# Patient Record
Sex: FEMALE | Race: BLACK OR AFRICAN AMERICAN | Employment: STUDENT | URBAN - NONMETROPOLITAN AREA
[De-identification: names, ages, dates, MRNs, and addresses within clinical notes are randomized per-mention and may not be internally consistent; named-entity substitution may affect disease eponyms.]

---
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
## 2022-01-04 ENCOUNTER — OFFICE VISIT (OUTPATIENT)
Dept: FAMILY MEDICINE CLINIC | Age: 22
End: 2022-01-04
Payer: COMMERCIAL

## 2022-01-04 VITALS
TEMPERATURE: 97.3 F | RESPIRATION RATE: 12 BRPM | SYSTOLIC BLOOD PRESSURE: 122 MMHG | DIASTOLIC BLOOD PRESSURE: 70 MMHG | WEIGHT: 166 LBS | HEIGHT: 63 IN | BODY MASS INDEX: 29.41 KG/M2 | HEART RATE: 81 BPM | OXYGEN SATURATION: 99 %

## 2022-01-04 DIAGNOSIS — Z11.59 ENCOUNTER FOR HEPATITIS C SCREENING TEST FOR LOW RISK PATIENT: ICD-10-CM

## 2022-01-04 DIAGNOSIS — E55.9 VITAMIN D DEFICIENCY: ICD-10-CM

## 2022-01-04 DIAGNOSIS — Z00.00 WELLNESS EXAMINATION: Primary | ICD-10-CM

## 2022-01-04 DIAGNOSIS — E88.81 INSULIN RESISTANCE: ICD-10-CM

## 2022-01-04 DIAGNOSIS — Z11.4 SCREENING FOR HIV (HUMAN IMMUNODEFICIENCY VIRUS): ICD-10-CM

## 2022-01-04 DIAGNOSIS — R68.89 COLD INTOLERANCE: ICD-10-CM

## 2022-01-04 DIAGNOSIS — Z00.00 ENCOUNTER FOR WELL ADULT EXAM WITHOUT ABNORMAL FINDINGS: ICD-10-CM

## 2022-01-04 PROCEDURE — 99395 PREV VISIT EST AGE 18-39: CPT | Performed by: NURSE PRACTITIONER

## 2022-01-04 PROCEDURE — G8484 FLU IMMUNIZE NO ADMIN: HCPCS | Performed by: NURSE PRACTITIONER

## 2022-01-04 SDOH — ECONOMIC STABILITY: FOOD INSECURITY: WITHIN THE PAST 12 MONTHS, YOU WORRIED THAT YOUR FOOD WOULD RUN OUT BEFORE YOU GOT MONEY TO BUY MORE.: NEVER TRUE

## 2022-01-04 SDOH — ECONOMIC STABILITY: FOOD INSECURITY: WITHIN THE PAST 12 MONTHS, THE FOOD YOU BOUGHT JUST DIDN'T LAST AND YOU DIDN'T HAVE MONEY TO GET MORE.: NEVER TRUE

## 2022-01-04 ASSESSMENT — ENCOUNTER SYMPTOMS
EYE DISCHARGE: 0
COLOR CHANGE: 0
RHINORRHEA: 0
BLOOD IN STOOL: 0
NAUSEA: 0
CONSTIPATION: 0
DIARRHEA: 0
ABDOMINAL DISTENTION: 0
SHORTNESS OF BREATH: 0
COUGH: 0
ABDOMINAL PAIN: 0
SORE THROAT: 0
ANAL BLEEDING: 0
EYE REDNESS: 0

## 2022-01-04 ASSESSMENT — PATIENT HEALTH QUESTIONNAIRE - PHQ9
SUM OF ALL RESPONSES TO PHQ9 QUESTIONS 1 & 2: 0
SUM OF ALL RESPONSES TO PHQ QUESTIONS 1-9: 0
1. LITTLE INTEREST OR PLEASURE IN DOING THINGS: 0
SUM OF ALL RESPONSES TO PHQ QUESTIONS 1-9: 0
SUM OF ALL RESPONSES TO PHQ QUESTIONS 1-9: 0
2. FEELING DOWN, DEPRESSED OR HOPELESS: 0
SUM OF ALL RESPONSES TO PHQ QUESTIONS 1-9: 0

## 2022-01-04 ASSESSMENT — SOCIAL DETERMINANTS OF HEALTH (SDOH): HOW HARD IS IT FOR YOU TO PAY FOR THE VERY BASICS LIKE FOOD, HOUSING, MEDICAL CARE, AND HEATING?: NOT HARD AT ALL

## 2022-01-04 NOTE — PROGRESS NOTES
Health Maintenance Due   Topic Date Due    Hepatitis C screen  Never done    Depression Screen  Never done    HIV screen  Never done    Chlamydia screen  Never done    COVID-19 Vaccine (2 - Booster for [employer] series) 08/30/2021    Flu vaccine (1) 09/01/2021

## 2022-01-04 NOTE — PROGRESS NOTES
Well Adult Note  Name: Aleksander Garcia Date: 2022   MRN: [id number] Sex: Female   Age: 25 y.o. Ethnicity: Non- / Non    : 2000 Race: Verónica Sykes / Prince Cam is here for well adult exam.  History:    Health Habits: With regard to her health habits, she eats 3 meals and 0 snacks per day. She does exercise regularly:   Physical activities include: lifting, 6 times per week, 90 minutes/day. She does take over the counter vitamins. She never had a blood transfusion. Does have tattoo. She wears seatbelts while riding a car. She does not text or talk on the phone while driving. She performs all of her ADL's without problem. She is independent, she cooks, drives, bathes, and gets dressed without assistance. She is not . She has 0 children. She does work. She works 25-30 hours a week. Health Maintenance   Topic Date Due    Hepatitis C screen  Never done    Depression Screen  Never done    HIV screen  Never done    Chlamydia screen  Never done    COVID-19 Vaccine (2 - Booster for [employer] series) 2021    Flu vaccine (1) 2021    DTaP/Tdap/Td vaccine (7 - Td or Tdap) 2025    Hepatitis A vaccine  Completed    Hepatitis B vaccine  Completed    Hib vaccine  Completed    HPV vaccine  Completed    Varicella vaccine  Completed    Meningococcal (ACWY) vaccine  Completed    Pneumococcal 0-64 years Vaccine  Aged Out     She  reports that she has never smoked. She has never used smokeless tobacco. She reports that she does not drink alcohol and does not use drugs. .  Has not had a pap yet . Her last menstrual cycle was 3weeks ago. She is not sexually active. She has had 0sexual partner for the last 1 years. Review of Systems   Constitutional: Negative for chills, fatigue and fever. HENT: Negative for congestion, ear pain, postnasal drip, rhinorrhea and sore throat. Eyes: Negative for discharge and redness.    Respiratory: Negative for cough and shortness of breath. Cardiovascular: Negative for chest pain and leg swelling. Gastrointestinal: Negative for abdominal distention, abdominal pain, anal bleeding, blood in stool, constipation, diarrhea and nausea. Skin: Negative for color change and rash. Neurological: Negative for facial asymmetry, speech difficulty and weakness. Hematological: Does not bruise/bleed easily. Psychiatric/Behavioral: Negative for agitation and confusion. No Known Allergies      Prior to Visit Medications    Medication Sig Taking? Authorizing Provider   Multiple Vitamins-Minerals (MULTIVITAMIN ADULTS PO) Take by mouth daily Yes Historical Provider, MD   Cyanocobalamin (VITAMIN B-12 PO) Take by mouth daily Yes Historical Provider, MD   CREATINE PO Take by mouth Yes Historical Provider, MD   NONFORMULARY daily BCAA Powder Yes Historical Provider, MD   COLLAGEN PO Take by mouth daily Yes Historical Provider, MD   ibuprofen (ADVIL;MOTRIN) 600 MG tablet Take 1 tablet by mouth 3 times daily as needed for Pain or Fever (Take with food 3 times daily for pain or fever)  Patient not taking: Reported on 1/4/2022  Jazz Zarate MD       History reviewed. No pertinent past medical history. History reviewed. No pertinent surgical history.       Family History   Problem Relation Age of Onset    High Blood Pressure Mother     Arthritis Neg Hx     Birth Defects Neg Hx     Cancer Neg Hx     Diabetes Neg Hx     Hearing Loss Neg Hx     Kidney Disease Neg Hx     Mental Illness Neg Hx     Mental Retardation Neg Hx        Social History     Tobacco Use    Smoking status: Never Smoker    Smokeless tobacco: Never Used   Vaping Use    Vaping Use: Never used   Substance Use Topics    Alcohol use: No    Drug use: No       Objective   /70 (Site: Left Upper Arm, Position: Sitting, Cuff Size: Medium Adult)   Pulse 81   Temp 97.3 °F (36.3 °C) (Temporal)   Resp 12   Ht 5' 3\" (1.6 m)   Wt 166 lb (75.3 kg)   [facility] 12/14/2021   SpO2 99%   BMI 29.41 kg/m²   Wt Readings from Last 3 Encounters:   01/04/22 166 lb (75.3 kg) (91 %, Z= 1.34)*   10/25/20 156 lb (70.8 kg) (88 %, Z= 1.18)*   01/20/18 161 lb (73 kg) (93 %, Z= 1.51)*     * Growth percentiles are based on [facility] (Girls, 2-20 Years) data. There were no vitals filed for this visit. Physical Exam  Constitutional:       Appearance: Normal appearance. She is well-developed. She is not toxic-appearing or diaphoretic. HENT:      Head: Normocephalic and atraumatic. Right Ear: Hearing normal.      Left Ear: Hearing normal.      Nose: No nasal deformity. Eyes:      General:         Right eye: No discharge. Left eye: No discharge. Cardiovascular:      Rate and Rhythm: Normal rate and regular rhythm. Pulmonary:      Effort: Pulmonary effort is normal. No prolonged expiration or respiratory distress. Breath sounds: Wheezing present. Musculoskeletal:      Cervical back: Normal range of motion. Skin:     Findings: No rash (On exposed areas visible on camera). Neurological:      Mental Status: She is alert and oriented to person, place, and time. Psychiatric:         Attention and Perception: Attention normal.         Mood and Affect: Mood normal.         Speech: Speech normal.         Behavior: Behavior normal.         Thought Content: Thought content normal.         Cognition and Memory: Cognition and memory normal.         Judgment: Judgment normal.           Assessment   Plan   1. Wellness examination  -     CBC Auto Differential; Future  -     Hepatic Function Panel; Future  -     Comprehensive Metabolic Panel; Future  -     High sensitivity CRP; Future  -     Lipid Panel; Future  -     TSH with Reflex; Future  -     Vitamin D 25 Hydroxy; Future  2. Insulin resistance  -     Comprehensive Metabolic Panel; Future  3. Vitamin D deficiency  -     Vitamin D 25 Hydroxy; Future  4.  Cold intolerance  -     CBC Auto Differential; Future  5. Encounter for well adult exam without abnormal findings  6. Screening for HIV (human immunodeficiency virus)  -     HIV Screen; Future  7. Encounter for hepatitis C screening test for low risk patient  -     Hepatitis C Antibody;  Future         Personalized Preventive Plan   Current Health Maintenance Status  Immunization History   Administered Date(s) Administered    COVID-19, J&J, PF, 0.5 mL 07/05/2021    DTP 04/29/2003, 07/28/2003, 09/29/2003, 08/16/2004, 04/03/2008    HPV (Human Papilloma Virus)Vaccine 06/07/2012    HPV Quadrivalent (Gardasil) 07/29/2013, 11/12/2013    Hepatitis A Ped/Adol (Havrix, Vaqta) 06/07/2012, 07/29/2013    Hepatitis B Ped/Adol (Engerix-B, Recombivax HB) 02/18/2003, 04/29/2003, 09/29/2003    Hib vaccine 02/18/2003, 04/29/2003, 08/16/2004    Influenza Nasal 10/12/2010, 11/21/2012, 11/12/2013, 10/06/2014    Influenza Virus Vaccine 09/24/2014    Influenza, High Dose (Fluzone 65 yrs and older) 10/12/2010    MMR 08/16/2004, 04/03/2008    Meningococcal B, OMV (Bexsero) 03/11/2020    Meningococcal MCV4P (Menactra) 06/22/2015, 03/11/2020    Polio IPV (IPOL) 02/18/2003, 04/29/2003    Polio Virus Vaccine 04/03/2008    Tdap (Boostrix, Adacel) 06/22/2015    Varicella (Varivax) 04/03/2008, 07/29/2013        Health Maintenance   Topic Date Due    Hepatitis C screen  Never done    Depression Screen  Never done    HIV screen  Never done    Chlamydia screen  Never done    COVID-19 Vaccine (2 - Booster for [employer] series) 08/30/2021    Flu vaccine (1) 09/01/2021    DTaP/Tdap/Td vaccine (7 - Td or Tdap) 06/22/2025    Hepatitis A vaccine  Completed    Hepatitis B vaccine  Completed    Hib vaccine  Completed    HPV vaccine  Completed    Varicella vaccine  Completed    Meningococcal (ACWY) vaccine  Completed    Pneumococcal 0-64 years Vaccine  Aged Out     Recommendations for dakick Due: see orders and patient instructions/AVS.    Return in about 1 year (around 1/4/2023) for Annual Physical.

## 2022-01-04 NOTE — PATIENT INSTRUCTIONS
Thank you   1. Thank you for trusting us with your healthcare needs. You may receive a survey regarding today's visit. It would help us out if you would take a few moments to provide your feedback. We value your input. 2. Please bring in ALL medications BOTTLES, including inhalers, herbal supplements, over the counter, prescribed & non-prescribed medicine. The office would like actual medication bottles and a list.   3. Please note our OFFICE POLICIES:   a. Prior to getting your labs drawn, please check with your insurance company for benefits and eligibility of lab services. Often, insurance companies cover certain tests for preventative visits only. It is patient's responsibility to see what is covered. b. We are unable to change a diagnosis after the test has been performed. c. Lab orders will not be re-printed. Please hold onto your original lab orders and take them to your lab to be completed. d. If you no show your scheduled appointment three times, you will be dismissed from this practice. e. Donalda Mandril must be completed 24 hours prior to your schedule appointment. 4. If the list below has been completed, PLEASE FAX RECORDS TO OUR OFFICE @ [phone]. Once the records have been received we will update your records at our office:  Health Maintenance Due   Topic Date Due    Hepatitis C screen  Never done    Depression Screen  Never done    HIV screen  Never done    Chlamydia screen  Never done    COVID-19 Vaccine (2 - Booster for [employer] series) 08/30/2021    Flu vaccine (1) 09/01/2021             Patient Education        Insulin Resistance: Care Instructions  Your Care Instructions     Insulin resistance means that the body can't use insulin as it should. Insulin lets sugar (glucose) enter the body's cells, where it is used for energy. It also helps muscles, fat, and liver cells store sugar to be released when needed.  If the body tissues don't respond to insulin right, the blood sugar level rises.  Insulin resistance mainly is caused by obesity. But other medical conditions, such as acromegaly and Cushing's syndrome, also can cause it. It can run in families too. Follow-up care is a key part of your treatment and safety. Be sure to make and go to all appointments, and call your doctor if you are having problems. It's also a good idea to know your test results and keep a list of the medicines you take. How can you care for yourself at home? · Take your medicines exactly as prescribed. Call your doctor if you think you are having a problem with your medicine. You will get more details on the specific medicines your doctor prescribes. · Make healthy food choices. · Get at least 30 minutes of exercise on most days of the week. Exercise helps control your blood sugar. It also helps you stay at a healthy weight. Walking is a good choice. You also may want to do other activities, such as running, swimming, cycling, or playing tennis or team sports. · Try to lose weight. Losing even a small amount of weight can help. · Do not smoke. If you need help quitting, talk to your doctor about stop-smoking programs and medicines. These can increase your chances of quitting for good. When should you call for help? Watch closely for changes in your health, and be sure to contact your doctor if:    · Your blood sugar stays outside the level your doctor set for you.     · You have any problems. Where can you learn more? Go to https://Science Fantasypesindhu.[employer]fin. org and sign in to your [employer] account. Enter [phone] [id number] [phone] in the [facility] box to learn more about \"Insulin Resistance: Care Instructions. \"     If you do not have an account, please click on the \"Sign Up Now\" link. Current as of: July 28, 2021               Content Version: 13.1  © 3136-1790 Healthwise, Incorporated. Care instructions adapted under license by [facility] ([facility]).  If you have questions about a medical condition or this instruction, always ask your healthcare professional. [place] [address] any warranty or liability for your use of this information. Patient Education        Well Visit, Ages 25 to 48: Care Instructions  Overview     Well visits can help you stay healthy. Your doctor has checked your overall health and may have suggested ways to take good care of yourself. Your doctor also may have recommended tests. At home, you can help prevent illness with healthy eating, regular exercise, and other steps. Follow-up care is a key part of your treatment and safety. Be sure to make and go to all appointments, and call your doctor if you are having problems. It's also a good idea to know your test results and keep a list of the medicines you take. How can you care for yourself at home? · Get screening tests that you and your doctor decide on. Screening helps find diseases before any symptoms appear. · Eat healthy foods. Choose fruits, vegetables, whole grains, protein, and low-fat dairy foods. Limit fat, especially saturated fat. Reduce salt in your diet. · Limit alcohol. If you are a man, have no more than 2 drinks a day or 14 drinks a week. If you are a woman, have no more than 1 drink a day or 7 drinks a week. · Get at least 30 minutes of physical activity on most days of the week. Walking is a good choice. You also may want to do other activities, such as running, swimming, cycling, or playing tennis or team sports. Discuss any changes in your exercise program with your doctor. · Reach and stay at a healthy weight. This will lower your risk for many problems, such as obesity, diabetes, heart disease, and high blood pressure. · Do not smoke or allow others to smoke around you. If you need help quitting, talk to your doctor about stop-smoking programs and medicines. These can increase your chances of quitting for good. · Care for your mental health. It is easy to get weighed down by worry and stress.  Learn strategies to manage stress, like deep breathing and mindfulness, and stay connected with your family and community. If you find you often feel sad or hopeless, talk with your doctor. Treatment can help. · Talk to your doctor about whether you have any risk factors for sexually transmitted infections (STIs). You can help prevent STIs if you wait to have sex with a new partner (or partners) until you've each been tested for STIs. It also helps if you use condoms (male or female condoms) and if you limit your sex partners to one person who only has sex with you. Vaccines are available for some STIs, such as HPV. · Use birth control if it's important to you to prevent pregnancy. Talk with your doctor about the choices available and what might be best for you. · If you think you may have a problem with alcohol or drug use, talk to your doctor. This includes prescription medicines (such as amphetamines and opioids) and illegal drugs (such as cocaine and methamphetamine). Your doctor can help you figure out what type of treatment is best for you. · Protect your skin from too much sun. When you're outdoors from 10 a.m. to 4 p.m., stay in the shade or cover up with clothing and a hat with a wide brim. Wear sunglasses that block UV rays. Even when it's cloudy, put broad-spectrum sunscreen (SPF 30 or higher) on any exposed skin. · See a dentist one or two times a year for checkups and to have your teeth cleaned. · Wear a seat belt in the car. When should you call for help? Watch closely for changes in your health, and be sure to contact your doctor if you have any problems or symptoms that concern you. Where can you learn more? Go to https://Dashlaneyg.health-partners. org and sign in to your [employer] account. Enter P072 in the [employer] box to learn more about \"Well Visit, Ages 25 to 48: Care Instructions. \"     If you do not have an account, please click on the \"Sign Up Now\" link.   Current as of: October 6, 2021               Content Version: 13.1  © 2006-2021 Healthwise, [employer]. Care instructions adapted under license by [facility] ([facility]). If you have questions about a medical condition or this instruction, always ask your healthcare professional. [address] any warranty or liability for your use of this information. Patient Education        Learning About Vitamin D  Why is it important to get enough vitamin D? Your body needs vitamin D to absorb calcium. Calcium keeps your bones and muscles, including your heart, healthy and strong. If your muscles don't get enough calcium, they can cramp, hurt, or feel weak. You may have long-term (chronic) muscle aches and pains. If you don't get enough vitamin D throughout life, you have an increased chance of having thin and brittle bones (osteoporosis) in your later years. Children who don't get enough vitamin D may not grow as much as others their age. They also have a chance of getting a rare disease called rickets. It causes weak bones. Vitamin D and calcium are added to many foods. And your body uses sunshine to make its own vitamin D. How much vitamin D do you need? The recommended dietary allowance (RDA) for vitamin D is 600 IU (international units) every day for people ages 3 through 79. Adults 71 and older need 800 IU every day. How can you get more vitamin D? Foods that contain vitamin D include:  · [place], tuna, and mackerel. These are some of the best foods to eat when you need to get more vitamin D.  · Cheese, egg yolks, and beef liver. These foods have vitamin D in small amounts. · Milk, soy drinks, orange juice, yogurt, margarine, and some kinds of cereal have vitamin D added to them. Some people don't make vitamin D as well as others. They may have to take extra care in getting enough vitamin D. Things that reduce how much vitamin D your body makes include:  · Having dark skin.   · Age, especially if you are older than 72. · Digestive problems, such as Crohn's or celiac disease. · Liver and kidney disease. Some people who do not get enough vitamin D may need supplements. Are there any risks from taking vitamin D?  · Too much vitamin D:  ? Can damage your kidneys. ? Can cause nausea and vomiting, constipation, and weakness. ? Raises the amount of calcium in your blood. If this happens, you can get confused or have an irregular heart rhythm. · Vitamin D may interact with other medicines. Tell your doctor about all of the medicines you take, including over-the-counter drugs, herbs, and pills. Tell your doctor about all of your current medical problems. Where can you learn more? Go to https://Gera-IT.[employer]. org and sign in to your [employer] account. Enter [id number] in the [employer] box to learn more about \"Learning About Vitamin D. \"     If you do not have an account, please click on the \"Sign Up Now\" link. Current as of: September 8, 2021               Content Version: 13.1  © 2006-2021 [employer]. Care instructions adapted under license by [employer]. If you have questions about a medical condition or this instruction, always ask your healthcare professional. [place] [address] any warranty or liability for your use of this information.

## 2022-01-09 ENCOUNTER — HOSPITAL ENCOUNTER (EMERGENCY)
Age: 22
Discharge: HOME | End: 2022-01-09
Payer: COMMERCIAL

## 2022-01-09 VITALS
OXYGEN SATURATION: 100 % | DIASTOLIC BLOOD PRESSURE: 74 MMHG | RESPIRATION RATE: 18 BRPM | SYSTOLIC BLOOD PRESSURE: 139 MMHG | TEMPERATURE: 97.1 F | HEART RATE: 82 BPM

## 2022-01-09 DIAGNOSIS — Z20.822 EXPOSURE TO COVID-19 VIRUS: Primary | ICD-10-CM

## 2022-01-09 DIAGNOSIS — Z20.822 LAB TEST NEGATIVE FOR COVID-19 VIRUS: ICD-10-CM

## 2022-01-09 LAB — SARS-COV-2, NAA: NOT  DETECTED

## 2022-01-09 PROCEDURE — 99213 OFFICE O/P EST LOW 20 MIN: CPT

## 2022-01-09 PROCEDURE — 99213 OFFICE O/P EST LOW 20 MIN: CPT | Performed by: NURSE PRACTITIONER

## 2022-01-09 PROCEDURE — 87635 SARS-COV-2 COVID-19 AMP PRB: CPT

## 2022-01-09 ASSESSMENT — ENCOUNTER SYMPTOMS
SINUS PRESSURE: 0
TROUBLE SWALLOWING: 0
BACK PAIN: 0
NAUSEA: 0
RHINORRHEA: 0
SHORTNESS OF BREATH: 0
COUGH: 0
SORE THROAT: 0
DIARRHEA: 0
VOMITING: 0

## 2022-01-09 NOTE — ED NOTES
Discharge assessment complete. No changes. All discharge education and information given. Instructed to go to ED for any worsening symptoms. Verbalized Understanding. Left in stable cond.      Cullen Dolan RN  01/09/22 [id number]

## 2022-01-09 NOTE — ED PROVIDER NOTES
[facility] 36  Urgent Care Encounter       CHIEF COMPLAINT       Chief Complaint   Patient presents with    Covid Testing     chills headache body aches       Nurses Notes reviewed and I agree except as noted in the HPI. HISTORY OF PRESENT ILLNESS   Kalpana Eastman is a 25 y.o. female who presents to the urgent care center complaining of body aches chills and a dull headache. Patient states that she was around her brother who tested positive yesterday. Patient denies any other symptoms at the present time. Patient has not taken any medication patient sitting on table does not appear to be in any acute distress at the present time. The history is provided by the patient. No  was used. REVIEW OF SYSTEMS     Review of Systems   Constitutional: Positive for chills. Negative for activity change, appetite change, fatigue and fever. HENT: Negative for congestion, ear pain, rhinorrhea, sinus pressure, sore throat and trouble swallowing. Respiratory: Negative for cough and shortness of breath. Cardiovascular: Negative for chest pain. Gastrointestinal: Negative for diarrhea, nausea and vomiting. Musculoskeletal: Positive for myalgias. Negative for back pain and neck stiffness. Skin: Negative for rash. Allergic/Immunologic: Negative for environmental allergies. Neurological: Positive for headaches. Negative for dizziness and light-headedness. Hematological: Negative for adenopathy. PAST MEDICAL HISTORY   History reviewed. No pertinent past medical history. SURGICALHISTORY     Patient  has no past surgical history on file.     CURRENT MEDICATIONS       Discharge Medication List as of 1/9/2022  4:48 PM      CONTINUE these medications which have NOT CHANGED    Details   Multiple Vitamins-Minerals (MULTIVITAMIN ADULTS PO) Take by mouth dailyHistorical Med      Cyanocobalamin (VITAMIN B-12 PO) Take by mouth dailyHistorical Med      CREATINE PO Take by mouthHistorical Med      NONFORMULARY daily BCAA PowderHistorical Med      COLLAGEN PO Take by mouth dailyHistorical Med      ibuprofen (ADVIL;MOTRIN) 600 MG tablet Take 1 tablet by mouth 3 times daily as needed for Pain or Fever (Take with food 3 times daily for pain or fever), Disp-20 tablet, R-0Print             ALLERGIES     Patient is has No Known Allergies. Patients   Immunization History   Administered Date(s) Administered    COVID-19, J&J, PF, 0.5 mL 07/05/2021    DTP 04/29/2003, 07/28/2003, 09/29/2003, 08/16/2004, 04/03/2008    HPV (Human Papilloma Virus)Vaccine 06/07/2012    HPV Quadrivalent (Gardasil) 07/29/2013, 11/12/2013    Hepatitis A Ped/Adol (Havrix, Vaqta) 06/07/2012, 07/29/2013    Hepatitis B Ped/Adol (Engerix-B, Recombivax HB) 02/18/2003, 04/29/2003, 09/29/2003    Hib vaccine 02/18/2003, 04/29/2003, 08/16/2004    Influenza Nasal 10/12/2010, 11/21/2012, 11/12/2013, 10/06/2014    Influenza Virus Vaccine 09/24/2014    Influenza, High Dose (Fluzone 65 yrs and older) 10/12/2010    MMR 08/16/2004, 04/03/2008    Meningococcal B, OMV (Bexsero) 03/11/2020    Meningococcal MCV4P (Menactra) 06/22/2015, 03/11/2020    Polio IPV (IPOL) 02/18/2003, 04/29/2003    Polio Virus Vaccine 04/03/2008    Tdap (Boostrix, Adacel) 06/22/2015    Varicella (Varivax) 04/03/2008, 07/29/2013       FAMILY HISTORY     Patient's family history includes High Blood Pressure in her mother. SOCIAL HISTORY     Patient  reports that she has never smoked. She has never used smokeless tobacco. She reports that she does not drink alcohol and does not use drugs. PHYSICAL EXAM     ED TRIAGE VITALS  BP: 139/74, Temp: 97.1 °F (36.2 °C), Heart Rate: 82, Resp: 18, SpO2: 100 %,Estimated body mass index is 29.41 kg/m² as calculated from the following:    Height as of 1/4/22: 5' 3\" (1.6 m). Weight as of 1/4/22: 166 lb (75.3 kg). ,Patient's last menstrual period was 12/14/2021.     Physical Exam  Vitals and nursing adenopathy. Left side of head: No submental, submandibular, tonsillar, preauricular, posterior auricular or occipital adenopathy. Cervical: No cervical adenopathy. Right cervical: No superficial, deep or posterior cervical adenopathy. Left cervical: No superficial, deep or posterior cervical adenopathy. Upper Body:      Right upper body: No supraclavicular adenopathy. Left upper body: No supraclavicular adenopathy. Skin:     General: Skin is warm and dry. Capillary Refill: Capillary refill takes less than 2 seconds. Findings: No rash. Neurological:      Mental Status: She is alert and oriented to person, place, and time. Psychiatric:         Mood and Affect: Mood normal.         Behavior: Behavior normal. Behavior is cooperative. DIAGNOSTIC RESULTS     Labs:  Results for orders placed or performed during the hospital encounter of 01/09/22   COVID-19, Rapid   Result Value Ref Range    SARS-CoV-2, SARAH NOT  DETECTED NOT DETECTED       IMAGING:    No orders to display         EKG:      URGENT CARE COURSE:     Vitals:    01/09/22 1609   BP: 139/74   Pulse: 82   Resp: 18   Temp: 97.1 °F (36.2 °C)   TempSrc: Tympanic   SpO2: 100%       Medications - No data to display         PROCEDURES:  None    FINAL IMPRESSION      1. Exposure to COVID-19 virus    2. Lab test negative for COVID-19 virus          DISPOSITION/ PLAN      The patient was advised to drink plenty of fluids. They may take Tylenol for fever or body aches. Take prescribed medication as directed. They may also take OTC antihistamines/ decongestant as needed. Pt is advised to go to ER if symptoms worsen, new symptoms develop, high fever >102, vomiting, breathing difficulty, lethargy, chest pain or shortness of breath Dial 911. The patient or patient's representative is agreeable to the treatment plan they're advised to follow-up with her primary care provider in one week for reevaluation.     PATIENT REFERRED TO:  CYNTHIA Cesar CNP   [address] / [facility] [address]      DISCHARGE MEDICATIONS:  Discharge Medication List as of 1/9/2022  4:48 PM          Discharge Medication List as of 1/9/2022  4:48 PM          Discharge Medication List as of 1/9/2022  4:48 PM          CYNTHIA Stack CNP    (Please note that portions of this note were completed with a voice recognition program. Efforts were made to edit the dictations but occasionally words are mis-transcribed.)           CYNTHIA tSack CNP  01/09/22 [id number]

## 2022-01-10 ENCOUNTER — TELEPHONE (OUTPATIENT)
Dept: FAMILY MEDICINE CLINIC | Age: 22
End: 2022-01-10

## 2022-01-10 NOTE — LETTER
[facility]  [address] [address], [address]  Phone: [phone]  Fax: [phone]    January 10, 2022    Ana Ribera  [address] [place] [address] [address] [address]    Dear Guillermina Odom,    This letter is regarding your Emergency Department (ED) visit at [facility] on 1/9/22. Mary Mcgill wanted to make sure that you understand your discharge instructions and that you were able to fill any prescriptions that may have been ordered for you. Please contact the office at the above phone number if the ED advised you to follow up with Mary Mcgill, or if you have any further questions or needs. Also did you know -   *Visiting the ED for a non-emergency could result in higher co-pays than you would normally be subject to paying? *You can call your doctor even after hours so they can direct you to the most appropriate care. [facility]) practices can often offer you an appointment on the same day that you call. *We have some [facility] offices that offer Walk-in appointments; check our website for availability in your community, [email or web address].      *Evisits are now available for patients for $36 through [employer] for certain conditions:  * Sinus, cold and or cough       * Diarrhea            * Headache  * Heartburn                                * Poison Sneha          * Back pain     * Urinary problems                         If you do not have [employer] and are interested, please contact the office and a staff member may assist you or go to [email or web address].     Sincerely,   CYNTHIA Suarez CNP and your [facility]

## 2022-01-27 DIAGNOSIS — R10.13 EPIGASTRIC PAIN: Primary | ICD-10-CM

## 2022-01-27 RX ORDER — OMEPRAZOLE 20 MG/1
20 CAPSULE, DELAYED RELEASE ORAL DAILY
Qty: 30 CAPSULE | Refills: 3 | Status: SHIPPED | OUTPATIENT
Start: 2022-01-27

## 2022-06-04 ENCOUNTER — HOSPITAL ENCOUNTER (EMERGENCY)
Age: 22
Discharge: HOME | End: 2022-06-04
Payer: COMMERCIAL

## 2022-06-04 VITALS
TEMPERATURE: 99.2 F | WEIGHT: 155 LBS | OXYGEN SATURATION: 97 % | HEART RATE: 103 BPM | DIASTOLIC BLOOD PRESSURE: 58 MMHG | RESPIRATION RATE: 16 BRPM | SYSTOLIC BLOOD PRESSURE: 108 MMHG | BODY MASS INDEX: 27.46 KG/M2

## 2022-06-04 DIAGNOSIS — J01.00 ACUTE NON-RECURRENT MAXILLARY SINUSITIS: Primary | ICD-10-CM

## 2022-06-04 PROCEDURE — 99213 OFFICE O/P EST LOW 20 MIN: CPT | Performed by: EMERGENCY MEDICINE

## 2022-06-04 PROCEDURE — 99213 OFFICE O/P EST LOW 20 MIN: CPT

## 2022-06-04 RX ORDER — FLUTICASONE PROPIONATE 50 MCG
1 SPRAY, SUSPENSION (ML) NASAL DAILY
Qty: 16 G | Refills: 0 | Status: SHIPPED | OUTPATIENT
Start: 2022-06-04

## 2022-06-04 RX ORDER — LORATADINE AND PSEUDOEPHEDRINE SULFATE 10; 240 MG/1; MG/1
1 TABLET, EXTENDED RELEASE ORAL DAILY
Qty: 15 TABLET | Refills: 0 | Status: SHIPPED | OUTPATIENT
Start: 2022-06-04

## 2022-06-04 ASSESSMENT — PAIN SCALES - GENERAL: PAINLEVEL_OUTOF10: 6

## 2022-06-04 ASSESSMENT — ENCOUNTER SYMPTOMS
SORE THROAT: 1
RHINORRHEA: 1
COUGH: 0
SINUS PRESSURE: 1
SHORTNESS OF BREATH: 0

## 2022-06-04 ASSESSMENT — PAIN DESCRIPTION - DESCRIPTORS: DESCRIPTORS: ACHING

## 2022-06-04 ASSESSMENT — PAIN DESCRIPTION - LOCATION: LOCATION: HEAD

## 2022-06-04 ASSESSMENT — PAIN - FUNCTIONAL ASSESSMENT: PAIN_FUNCTIONAL_ASSESSMENT: 0-10

## 2022-06-04 ASSESSMENT — PAIN DESCRIPTION - ORIENTATION: ORIENTATION: ANTERIOR

## 2022-06-04 NOTE — ED PROVIDER NOTES
[place]  Urgent Care Encounter       CHIEF COMPLAINT       Chief Complaint   Patient presents with    Facial Pain      sinus congestion and fever x 3 days       Nurses Notes reviewed and I agree except as noted in the HPI. HISTORY OF PRESENT ILLNESS   Trevor Lobato is a 25 y.o. female who presents for complaints of sinus pain and pressure, nasal congestion, rhinorrhea. Symptoms for 2 days. She reports her mother had similar symptoms 1 week ago and was treated for sinusitis. No known fever but states she did have chills. They did improve with ibuprofen. She denies a cough, no chest pain or shortness of breath. Denies nausea, vomiting or diarrhea. HPI    REVIEW OF SYSTEMS     Review of Systems   Constitutional: Positive for chills. Negative for fever. HENT: Positive for congestion, rhinorrhea, sinus pressure and sore throat. Respiratory: Negative for cough and shortness of breath. Cardiovascular: Negative for chest pain. Neurological: Positive for dizziness and headaches. Psychiatric/Behavioral: Negative for behavioral problems. PAST MEDICAL HISTORY   History reviewed. No pertinent past medical history. SURGICALHISTORY     Patient  has no past surgical history on file.     CURRENT MEDICATIONS       Discharge Medication List as of 6/4/2022  8:40 AM      CONTINUE these medications which have NOT CHANGED    Details   omeprazole (PRILOSEC) 20 MG delayed release capsule Take 1 capsule by mouth daily, Disp-30 capsule, R-3Normal      Multiple Vitamins-Minerals (MULTIVITAMIN ADULTS PO) Take by mouth dailyHistorical Med      Cyanocobalamin (VITAMIN B-12 PO) Take by mouth dailyHistorical Med      CREATINE PO Take by mouthHistorical Med      NONFORMULARY daily BCAA PowderHistorical Med      COLLAGEN PO Take by mouth dailyHistorical Med      ibuprofen (ADVIL;MOTRIN) 600 MG tablet Take 1 tablet by mouth 3 times daily as needed for Pain or Fever (Take with food 3 times daily for pain or fever), Disp-20 tablet, R-0Print             ALLERGIES     Patient is has No Known Allergies. Patients   Immunization History   Administered Date(s) Administered    COVID-19, J&J, PF, 0.5 mL 07/05/2021    DTP 04/29/2003, 07/28/2003, 09/29/2003, 08/16/2004, 04/03/2008    HPV (Human Papilloma Virus)Vaccine 06/07/2012    HPV Quadrivalent (Gardasil) 07/29/2013, 11/12/2013    Hepatitis A Ped/Adol (Havrix, Vaqta) 06/07/2012, 07/29/2013    Hepatitis B Ped/Adol (Engerix-B, Recombivax HB) 02/18/2003, 04/29/2003, 09/29/2003    Hib vaccine 02/18/2003, 04/29/2003, 08/16/2004    Influenza Nasal 10/12/2010, 11/21/2012, 11/12/2013, 10/06/2014    Influenza Virus Vaccine 09/24/2014    Influenza, High Dose (Fluzone 65 yrs and older) 10/12/2010    MMR 08/16/2004, 04/03/2008    Meningococcal B, OMV (Bexsero) 03/11/2020    Meningococcal MCV4P (Menactra) 06/22/2015, 03/11/2020    Polio IPV (IPOL) 02/18/2003, 04/29/2003    Polio Virus Vaccine 04/03/2008    Tdap (Boostrix, Adacel) 06/22/2015    Varicella (Varivax) 04/03/2008, 07/29/2013       FAMILY HISTORY     Patient's family history includes High Blood Pressure in her mother. SOCIAL HISTORY     Patient  reports that she has never smoked. She has never used smokeless tobacco. She reports that she does not drink alcohol and does not use drugs. PHYSICAL EXAM     ED TRIAGE VITALS  BP: (!) 108/58, Temp: 99.2 °F (37.3 °C), Heart Rate: (!) 103, Resp: 16, SpO2: 97 %,Estimated body mass index is 27.46 kg/m² as calculated from the following:    Height as of 1/4/22: 5' 3\" (1.6 m). Weight as of this encounter: 155 lb (70.3 kg). ,Patient's last menstrual period was 05/12/2022 (approximate). Physical Exam  Constitutional:       General: She is not in acute distress. Appearance: She is normal weight. She is not ill-appearing. HENT:      Head: Normocephalic. Nose: Congestion and rhinorrhea present.       Mouth/Throat:      Mouth: Mucous membranes are moist.   Cardiovascular:      Rate and Rhythm: Normal rate and regular rhythm. Pulses: Normal pulses. Heart sounds: Normal heart sounds. Pulmonary:      Effort: Pulmonary effort is normal. No respiratory distress. Breath sounds: Normal breath sounds. No wheezing or rhonchi. Skin:     General: Skin is warm and dry. Neurological:      General: No focal deficit present. Mental Status: She is alert. Psychiatric:         Mood and Affect: Mood normal.         Behavior: Behavior normal.         DIAGNOSTIC RESULTS     Labs:No results found for this visit on 06/04/22. IMAGING:    No orders to display         EKG:      URGENT CARE COURSE:     Vitals:    06/04/22 0822   BP: (!) 108/58   Pulse: (!) 103   Resp: 16   Temp: 99.2 °F (37.3 °C)   TempSrc: Temporal   SpO2: 97%   Weight: 155 lb (70.3 kg)       Medications - No data to display         PROCEDURES:  None    FINAL IMPRESSION      1. Acute non-recurrent maxillary sinusitis          DISPOSITION/ PLAN     Presents for what is likely viral sinusitis. Possibly allergen related. We will treat with Flonase, Claritin-D and advise drink plenty of fluids. Continue Tylenol or ibuprofen. COVID testing was offered and patient declines. I advised the patient COVID symptoms can sometimes be similar to sinus infections and try to avoid close contact with others.   Return for worsening cough, chest pain, shortness of breath or any new concerns      PATIENT REFERRED TO:  Rhodia Gowers, APRN - CNP  [place] [address] / [facility] [address]      DISCHARGE MEDICATIONS:  Discharge Medication List as of 6/4/2022  8:40 AM      START taking these medications    Details   loratadine-pseudoephedrine (CLARITIN-D 24 HOUR)  MG per extended release tablet Take 1 tablet by mouth daily, Disp-15 tablet, R-0Normal      fluticasone (FLONASE) 50 MCG/ACT nasal spray 1 spray by Each Nostril route daily, Disp-16 g, R-0Normal             Discharge Medication List as of 6/4/2022  8:40 AM          Discharge Medication List as of 6/4/2022  8:40 AM          CYNTHIA Diaz CNP    (Please note that portions of this note were completed with a voice recognition program. Efforts were made to edit the dictations but occasionally words are mis-transcribed.)          CYNTHIA Diaz CNP  06/04/22 8630

## 2022-06-05 ENCOUNTER — HOSPITAL ENCOUNTER (EMERGENCY)
Age: 22
Discharge: HOME | End: 2022-06-05
Payer: COMMERCIAL

## 2022-06-05 VITALS
HEIGHT: 62 IN | OXYGEN SATURATION: 98 % | RESPIRATION RATE: 16 BRPM | TEMPERATURE: 98.1 F | WEIGHT: 155 LBS | SYSTOLIC BLOOD PRESSURE: 106 MMHG | DIASTOLIC BLOOD PRESSURE: 76 MMHG | BODY MASS INDEX: 28.52 KG/M2 | HEART RATE: 102 BPM

## 2022-06-05 DIAGNOSIS — U07.1 COVID-19 VIRUS INFECTION: Primary | ICD-10-CM

## 2022-06-05 LAB — SARS-COV-2, NAA: DETECTED

## 2022-06-05 PROCEDURE — 99213 OFFICE O/P EST LOW 20 MIN: CPT | Performed by: EMERGENCY MEDICINE

## 2022-06-05 PROCEDURE — 99213 OFFICE O/P EST LOW 20 MIN: CPT

## 2022-06-05 PROCEDURE — 87635 SARS-COV-2 COVID-19 AMP PRB: CPT

## 2022-06-05 ASSESSMENT — ENCOUNTER SYMPTOMS
SINUS PRESSURE: 1
COUGH: 0
BACK PAIN: 0
RHINORRHEA: 1
SHORTNESS OF BREATH: 0

## 2022-06-05 NOTE — ED PROVIDER NOTES
[facility]       CHIEF COMPLAINT       Chief Complaint   Patient presents with    Positive For Covid-19       Nurses Notes reviewed and I agree except as noted in the HPI. HISTORY OF PRESENT ILLNESS   Hue Macias is a 25 y.o. female who presents for work sinus congestion, loss of smell, generalized fatigue. No cough, chest pain, or shortness of breath. Patient states that she has had COVID 19 twice in the past.  This feels similar but not as severe. Patient did test herself for COVID-19 yesterday and was positive. She did see her primary care provider yesterday and was placed on Claritin-D for her symptoms but was not tested at that time. HPI    REVIEW OF SYSTEMS     Review of Systems   Constitutional: Positive for fatigue. Negative for chills and fever. HENT: Positive for congestion, rhinorrhea and sinus pressure. Respiratory: Negative for cough and shortness of breath. Cardiovascular: Negative for chest pain. Musculoskeletal: Negative for back pain. Neurological: Negative for dizziness. Psychiatric/Behavioral: Negative for behavioral problems. PAST MEDICAL HISTORY   No past medical history on file. SURGICALHISTORY     Patient  has no past surgical history on file.     CURRENT MEDICATIONS       Discharge Medication List as of 6/5/2022 10:45 AM      CONTINUE these medications which have NOT CHANGED    Details   loratadine-pseudoephedrine (CLARITIN-D 24 HOUR)  MG per extended release tablet Take 1 tablet by mouth daily, Disp-15 tablet, R-0Normal      fluticasone (FLONASE) 50 MCG/ACT nasal spray 1 spray by Each Nostril route daily, Disp-16 g, R-0Normal      omeprazole (PRILOSEC) 20 MG delayed release capsule Take 1 capsule by mouth daily, Disp-30 capsule, R-3Normal      Multiple Vitamins-Minerals (MULTIVITAMIN ADULTS PO) Take by mouth dailyHistorical Med      Cyanocobalamin (VITAMIN B-12 PO) Take by mouth dailyHistorical Med CREATINE PO Take by mouthHistorical Med      NONFORMULARY daily BCAA PowderHistorical Med      COLLAGEN PO Take by mouth dailyHistorical Med      ibuprofen (ADVIL;MOTRIN) 600 MG tablet Take 1 tablet by mouth 3 times daily as needed for Pain or Fever (Take with food 3 times daily for pain or fever), Disp-20 tablet, R-0Print             ALLERGIES     Patient is has No Known Allergies. Patients   Immunization History   Administered Date(s) Administered    COVID-19, J&J, PF, 0.5 mL 07/05/2021    DTP 04/29/2003, 07/28/2003, 09/29/2003, 08/16/2004, 04/03/2008    HPV (Human Papilloma Virus)Vaccine 06/07/2012    HPV Quadrivalent (Gardasil) 07/29/2013, 11/12/2013    Hepatitis A Ped/Adol (Havrix, Vaqta) 06/07/2012, 07/29/2013    Hepatitis B Ped/Adol (Engerix-B, Recombivax HB) 02/18/2003, 04/29/2003, 09/29/2003    Hib vaccine 02/18/2003, 04/29/2003, 08/16/2004    Influenza Nasal 10/12/2010, 11/21/2012, 11/12/2013, 10/06/2014    Influenza Virus Vaccine 09/24/2014    Influenza, High Dose (Fluzone 65 yrs and older) 10/12/2010    MMR 08/16/2004, 04/03/2008    Meningococcal B, OMV (Bexsero) 03/11/2020    Meningococcal MCV4P (Menactra) 06/22/2015, 03/11/2020    Polio IPV (IPOL) 02/18/2003, 04/29/2003    Polio Virus Vaccine 04/03/2008    Tdap (Boostrix, Adacel) 06/22/2015    Varicella (Varivax) 04/03/2008, 07/29/2013       FAMILY HISTORY     Patient's family history includes High Blood Pressure in her mother. SOCIAL HISTORY     Patient  reports that she has never smoked. She has never used smokeless tobacco. She reports that she does not drink alcohol and does not use drugs. PHYSICAL EXAM     ED TRIAGE VITALS  BP: 106/76, Temp: 98.1 °F (36.7 °C), Heart Rate: (!) 102, Resp: 16, SpO2: 98 %,Estimated body mass index is 28.35 kg/m² as calculated from the following:    Height as of this encounter: 5' 2\" (1.575 m). Weight as of this encounter: 155 lb (70.3 kg). ,Patient's last menstrual period was 05/12/2022 (approximate). Physical Exam  Constitutional:       General: She is not in acute distress. Appearance: She is normal weight. She is not ill-appearing. HENT:      Head: Normocephalic. Nose: Congestion present. Mouth/Throat:      Mouth: Mucous membranes are moist.      Pharynx: Oropharynx is clear. No oropharyngeal exudate. Cardiovascular:      Rate and Rhythm: Normal rate and regular rhythm. Pulses: Normal pulses. Heart sounds: Normal heart sounds. Pulmonary:      Effort: Pulmonary effort is normal.      Breath sounds: Normal breath sounds. Skin:     General: Skin is warm and dry. Neurological:      General: No focal deficit present. Mental Status: She is alert. Psychiatric:         Mood and Affect: Mood normal.         Behavior: Behavior normal.         DIAGNOSTIC RESULTS     Labs:  Results for orders placed or performed during the hospital encounter of 06/05/22   COVID-19, Rapid   Result Value Ref Range    SARS-CoV-2, SARAH DETECTED (AA) NOT DETECTED       IMAGING:    No orders to display         EKG:      URGENT CARE COURSE:     Vitals:    06/05/22 1020   BP: 106/76   Pulse: (!) 102   Resp: 16   Temp: 98.1 °F (36.7 °C)   TempSrc: Temporal   SpO2: 98%   Weight: 155 lb (70.3 kg)   Height: 5' 2\" (1.575 m)       Medications - No data to display         PROCEDURES:  None    FINAL IMPRESSION      1. COVID-19 virus infection          DISPOSITION/ PLAN       Presents for COVID-19 viral infection. Patient has minimal symptoms at this time. She will be discharged and advised to continue Claritin-D, drink plenty of fluids. Tylenol/Motrin. Taken off work per [employer]. Return for new or worsening symptoms.     PATIENT REFERRED TO:  Vikas Vera APRN - CNP  [address] [facility] [address] / [place] [address]      DISCHARGE MEDICATIONS:  Discharge Medication List as of 6/5/2022 10:45 AM          Discharge Medication List as of 6/5/2022 10:45 AM          Discharge Medication List as of

## 2022-06-05 NOTE — Clinical Note
Shakeel Mann was seen and treated in our emergency department on 6/5/2022. COVID19 virus is suspected. Per the CDC guidelines we recommend home isolation until the following conditions are all met:    1. At least five days have passed since symptoms first appeared and/or had a close exposure,   2. After home isolation for five days, wearing a mask around others for the next five days,  3. At least 24 have passed since last fever without the use of fever-reducing medications and  4. Symptoms (eg cough, shortness of breath) have improved    If you have any questions or concerns, please don't hesitate to call.     She may return to work/school on 06/08/2022    Must wear a mask for an additional 5 days    Rodrigo Luna, APRN - CNP

## 2022-06-05 NOTE — ED TRIAGE NOTES
Pt presents to [facility] with c/o testing postive for covid at home.  Pt is symptomatic and is wanting to confirm positive test.

## 2022-06-06 ENCOUNTER — TELEPHONE (OUTPATIENT)
Dept: FAMILY MEDICINE CLINIC | Age: 22
End: 2022-06-06

## 2022-06-06 NOTE — LETTER
[facility]  [address], [address]  Phone: [phone]  Fax: [phone]    June 6, 2022    McRae Helena [place]iver[address] [address]      Dear Reuben Dinh,    This letter is regarding your Emergency Department (ED) visit at [facility] on 6/5/22. Dr. Don Shin wanted to make sure that you understand your discharge instructions and that you were able to fill any prescriptions that may have been ordered for you. Please contact the office at the above phone number if the ED advised you to follow up with Dr. Don Shin, or if you have any further questions or needs. Also did you know -   *Visiting the ED for a non-emergency could result in higher co-pays than you would normally be subject to paying? *You can call your doctor even after hours so they can direct you to the most appropriate care. [facility]) practices can often offer you an appointment on the same day that you call. *We have some [address] [place] [address] offices that offer Walk-in appointments; check our website for availability in your community, [email or web address]. [email or web address]      *Evisits are now available for patients for $36 through [employer] for certain conditions:  * Sinus, cold and or cough       * Diarrhea            * Headache  * Heartburn                                * Poison Sneha          * Back pain     * Urinary problems                         If you do not have [employer] and are interested, please contact the office and a staff member may assist you or go to [email or web address].     Sincerely,     CYNTHIA Howe - CNP and your [facility]

## 2022-08-14 ENCOUNTER — HOSPITAL ENCOUNTER (EMERGENCY)
Age: 22
Discharge: HOME | End: 2022-08-14
Payer: COMMERCIAL

## 2022-08-14 VITALS
BODY MASS INDEX: 28.17 KG/M2 | TEMPERATURE: 98.9 F | HEIGHT: 64 IN | RESPIRATION RATE: 16 BRPM | WEIGHT: 165 LBS | OXYGEN SATURATION: 100 % | DIASTOLIC BLOOD PRESSURE: 68 MMHG | HEART RATE: 67 BPM | SYSTOLIC BLOOD PRESSURE: 113 MMHG

## 2022-08-14 DIAGNOSIS — Z20.822 ENCOUNTER FOR LABORATORY TESTING FOR COVID-19 VIRUS: Primary | ICD-10-CM

## 2022-08-14 LAB
INFLUENZA A: NOT DETECTED
INFLUENZA B: NOT DETECTED
SARS-COV-2 RNA, RT PCR: NOT DETECTED

## 2022-08-14 PROCEDURE — 87636 SARSCOV2 & INF A&B AMP PRB: CPT

## 2022-08-14 PROCEDURE — 99213 OFFICE O/P EST LOW 20 MIN: CPT | Performed by: NURSE PRACTITIONER

## 2022-08-14 PROCEDURE — 99213 OFFICE O/P EST LOW 20 MIN: CPT

## 2022-08-14 ASSESSMENT — ENCOUNTER SYMPTOMS
VOMITING: 0
DIARRHEA: 0
COUGH: 0
CHEST TIGHTNESS: 0
NAUSEA: 0
RHINORRHEA: 0
SORE THROAT: 0
SHORTNESS OF BREATH: 0

## 2022-08-14 ASSESSMENT — PAIN - FUNCTIONAL ASSESSMENT: PAIN_FUNCTIONAL_ASSESSMENT: NONE - DENIES PAIN

## 2022-08-14 NOTE — ED PROVIDER NOTES
[facility]  Urgent Care Encounter       CHIEF COMPLAINT       Chief Complaint   Patient presents with    Covid Testing     travel       Nurses Notes reviewed and I agree except as noted in the HPI. HISTORY OF PRESENT ILLNESS   Olita Schirmer is a 25 y.o. female who presents To urgent care requesting a Covid test for travel or due to exposure. Patient currently denies symptoms of Covid including cough, dyspnea, headache, nausea, diarrhea, and loss of taste or smell. The history is provided by the patient. No  was used. REVIEW OF SYSTEMS     Review of Systems   Constitutional:  Negative for activity change, appetite change, chills, fatigue and fever. HENT:  Negative for ear discharge, ear pain, rhinorrhea and sore throat. Respiratory:  Negative for cough, chest tightness and shortness of breath. Cardiovascular:  Negative for chest pain. Gastrointestinal:  Negative for diarrhea, nausea and vomiting. Genitourinary:  Negative for dysuria. Skin:  Negative for rash. Allergic/Immunologic: Negative for environmental allergies and food allergies. Neurological:  Negative for dizziness and headaches. PAST MEDICAL HISTORY   History reviewed. No pertinent past medical history. SURGICALHISTORY     Patient  has no past surgical history on file.     CURRENT MEDICATIONS       Discharge Medication List as of 8/14/2022 10:28 AM        CONTINUE these medications which have NOT CHANGED    Details   loratadine-pseudoephedrine (CLARITIN-D 24 HOUR)  MG per extended release tablet Take 1 tablet by mouth daily, Disp-15 tablet, R-0Normal      fluticasone (FLONASE) 50 MCG/ACT nasal spray 1 spray by Each Nostril route daily, Disp-16 g, R-0Normal      omeprazole (PRILOSEC) 20 MG delayed release capsule Take 1 capsule by mouth daily, Disp-30 capsule, R-3Normal      Multiple Vitamins-Minerals (MULTIVITAMIN ADULTS PO) Take by mouth dailyHistorical Med Cyanocobalamin (VITAMIN B-12 PO) Take by mouth dailyHistorical Med      CREATINE PO Take by mouthHistorical Med      NONFORMULARY daily BCAA PowderHistorical Med      COLLAGEN PO Take by mouth dailyHistorical Med      ibuprofen (ADVIL;MOTRIN) 600 MG tablet Take 1 tablet by mouth 3 times daily as needed for Pain or Fever (Take with food 3 times daily for pain or fever), Disp-20 tablet, R-0Print             ALLERGIES     Patient is has No Known Allergies. Patients   Immunization History   Administered Date(s) Administered    COVID-19, J&J, (age 18y+), IM, 0.5 mL 07/05/2021    DTP 04/29/2003, 07/28/2003, 09/29/2003, 08/16/2004, 04/03/2008    HPV (Human Papilloma Virus)Vaccine 06/07/2012    HPV Quadrivalent (Gardasil) 07/29/2013, 11/12/2013    Hepatitis A Ped/Adol (Havrix, Vaqta) 06/07/2012, 07/29/2013    Hepatitis B Ped/Adol (Engerix-B, Recombivax HB) 02/18/2003, 04/29/2003, 09/29/2003    Hib vaccine 02/18/2003, 04/29/2003, 08/16/2004    Influenza Nasal 10/12/2010, 11/21/2012, 11/12/2013, 10/06/2014    Influenza Virus Vaccine 09/24/2014    Influenza, High Dose (Fluzone 65 yrs and older) 10/12/2010    MMR 08/16/2004, 04/03/2008    Meningococcal B, OMV (Bexsero) 03/11/2020    Meningococcal MCV4P (Menactra) 06/22/2015, 03/11/2020    Polio IPV (IPOL) 02/18/2003, 04/29/2003    Polio Virus Vaccine 04/03/2008    Tdap (Boostrix, Adacel) 06/22/2015    Varicella (Varivax) 04/03/2008, 07/29/2013       FAMILY HISTORY     Patient's family history includes High Blood Pressure in her mother. SOCIAL HISTORY     Patient  reports that she has never smoked. She has never used smokeless tobacco. She reports that she does not drink alcohol and does not use drugs. PHYSICAL EXAM     ED TRIAGE VITALS  BP: 113/68, Temp: 98.9 °F (37.2 °C), Heart Rate: 67, Resp: 16, SpO2: 100 %,Estimated body mass index is 28.32 kg/m² as calculated from the following:    Height as of this encounter: 5' 4\" (1.626 m).     Weight as of this encounter: 165 lb (74.8 kg). ,Patient's last menstrual period was 08/12/2022. Physical Exam  Vitals and nursing note reviewed. Constitutional:       General: She is not in acute distress. Appearance: Normal appearance. She is not ill-appearing, toxic-appearing or diaphoretic. HENT:      Head: Normocephalic. Right Ear: Ear canal and external ear normal.      Left Ear: Ear canal and external ear normal.      Nose: Nose normal. No congestion or rhinorrhea. Mouth/Throat:      Mouth: Mucous membranes are moist.      Pharynx: Oropharynx is clear. No oropharyngeal exudate or posterior oropharyngeal erythema. Cardiovascular:      Rate and Rhythm: Normal rate. Pulses: Normal pulses. Pulmonary:      Effort: Pulmonary effort is normal. No respiratory distress. Breath sounds: No stridor. No wheezing or rhonchi. Abdominal:      General: Abdomen is flat. Bowel sounds are normal.      Palpations: Abdomen is soft. Musculoskeletal:         General: No swelling or tenderness. Normal range of motion. Cervical back: Normal range of motion. Neurological:      General: No focal deficit present. Mental Status: She is alert and oriented to person, place, and time. Psychiatric:         Mood and Affect: Mood normal.         Behavior: Behavior normal.       DIAGNOSTIC RESULTS     Labs:No results found for this visit on 08/14/22. IMAGING:    No orders to display         EKG:none      URGENT CARE COURSE:     Vitals:    08/14/22 1022   BP: 113/68   Pulse: 67   Resp: 16   Temp: 98.9 °F (37.2 °C)   SpO2: 100%   Weight: 165 lb (74.8 kg)   Height: 5' 4\" (1.626 m)       Medications - No data to display         PROCEDURES:  None    FINAL IMPRESSION      1. Encounter for laboratory testing for COVID-19 virus          DISPOSITION/ PLAN     Patient seen for screening for Covid. Patient is swabbed for Covid and the test sent to the main lab. The patient is notified that the results should be back within 24 hours. He is given information on MyChart to check for his results. He is instructed to isolate until a negative test is obtained. He is instructed to follow-up with his PCP in 3 to 5 days with new or worsening symptoms. He is agreeable with the above plan and denies questions or concerns at this time.        PATIENT REFERRED TO:  CYNTHIA Holland CNP  [address] [facility] [address]      DISCHARGE MEDICATIONS:  Discharge Medication List as of 8/14/2022 10:28 AM          Discharge Medication List as of 8/14/2022 10:28 AM          Discharge Medication List as of 8/14/2022 10:28 AM          CYNTHIA Yun CNP    (Please note that portions of this note were completed with a voice recognition program. Efforts were made to edit the dictations but occasionally words are mis-transcribed.)           CYNHTIA Yun CNP  08/14/22 103

## 2022-08-15 ENCOUNTER — TELEPHONE (OUTPATIENT)
Dept: FAMILY MEDICINE CLINIC | Age: 22
End: 2022-08-15

## 2022-08-15 NOTE — LETTER
[facility]  [address], [address]  Phone: [phone]  Fax: [phone]    August 15, 2022    [facility] [address]      Dear Leighton Santana,    This letter is regarding your Emergency Department (ED) visit at [facility] on 8/14/22. Dr. Mario Adame wanted to make sure that you understand your discharge instructions and that you were able to fill any prescriptions that may have been ordered for you. Please contact the office at the above phone number if the ED advised you to follow up with Dr. Mario Adame, or if you have any further questions or needs. Also did you know -   *Visiting the ED for a non-emergency could result in higher co-pays than you would normally be subject to paying? *You can call your doctor even after hours so they can direct you to the most appropriate care. [facility]) practices can often offer you an appointment on the same day that you call. *We have some [facility] offices that offer Walk-in appointments; check our website for availability in your community, [email or web address].      *Evisits are now available for patients for $36 through [employer] for certain conditions:  * Sinus, cold and or cough       * Diarrhea            * Headache  * Heartburn                                * Poison Sneha          * Back pain     * Urinary problems                         If you do not have [email or web address] and are interested, please contact the office and a staff member may assist you or go to [email or web address].     Sincerely,     CYNTHIA Ulloa CNP and your [facility]

## 2024-12-20 ENCOUNTER — HOSPITAL ENCOUNTER (EMERGENCY)
Age: 24
Discharge: HOME | End: 2024-12-20

## 2024-12-20 VITALS
SYSTOLIC BLOOD PRESSURE: 128 MMHG | BODY MASS INDEX: 27.46 KG/M2 | DIASTOLIC BLOOD PRESSURE: 85 MMHG | TEMPERATURE: 98.7 F | RESPIRATION RATE: 20 BRPM | WEIGHT: 160 LBS | OXYGEN SATURATION: 98 % | HEART RATE: 75 BPM

## 2024-12-20 DIAGNOSIS — K04.7 DENTAL ABSCESS: Primary | ICD-10-CM

## 2024-12-20 PROCEDURE — 99213 OFFICE O/P EST LOW 20 MIN: CPT

## 2024-12-20 PROCEDURE — 99213 OFFICE O/P EST LOW 20 MIN: CPT | Performed by: NURSE PRACTITIONER

## 2024-12-20 RX ORDER — NAPROXEN 500 MG/1
500 TABLET ORAL 2 TIMES DAILY PRN
Qty: 20 TABLET | Refills: 0 | Status: SHIPPED | OUTPATIENT
Start: 2024-12-20 | End: 2024-12-30

## 2024-12-20 ASSESSMENT — PAIN DESCRIPTION - PAIN TYPE: TYPE: ACUTE PAIN

## 2024-12-20 ASSESSMENT — PAIN DESCRIPTION - ORIENTATION: ORIENTATION: RIGHT;LOWER

## 2024-12-20 ASSESSMENT — PAIN - FUNCTIONAL ASSESSMENT: PAIN_FUNCTIONAL_ASSESSMENT: 0-10

## 2024-12-20 ASSESSMENT — PAIN DESCRIPTION - LOCATION: LOCATION: TEETH

## 2024-12-20 ASSESSMENT — PAIN DESCRIPTION - DESCRIPTORS: DESCRIPTORS: ACHING

## 2024-12-20 ASSESSMENT — PAIN DESCRIPTION - FREQUENCY: FREQUENCY: CONTINUOUS

## 2024-12-20 ASSESSMENT — ENCOUNTER SYMPTOMS
VOICE CHANGE: 0
TROUBLE SWALLOWING: 0
COUGH: 0

## 2024-12-20 ASSESSMENT — PAIN SCALES - GENERAL: PAINLEVEL_OUTOF10: 4

## 2024-12-20 NOTE — DISCHARGE INSTR - COC
Continuity of Care Form    Patient Name: Awilda Arteaga   :  2000  MRN:  [id number]    Admit date:  2024  Discharge date:  ***    Code Status Order: No Order   Advance Directives:   Advance Care Flowsheet Documentation             Admitting Physician:  No admitting provider for patient encounter.  PCP: Skylar Almaraz, CYNTHIA Lynn CNP    Discharging Nurse: ***  Discharging Hospital Unit/Room#:   Discharging Unit Phone Number: ***    Emergency Contact:   Extended Emergency Contact Information  Primary Emergency Contact: Kevin Montiel   [facility]  Home Phone: [phone]  Relation: Parent  Secondary Emergency Contact: Consuelo Andres   [facility]  Home Phone: [phone]  Relation: Parent    Past Surgical History:  History reviewed. No pertinent surgical history.    Immunization History:   Immunization History   Administered Date(s) Administered    COVID-19, J&J, (age 18y+), IM, 0.5 mL 2021    DTP 2003, 2003, 2003, 2004, 2008    HPV (Human Papilloma Virus)Vaccine 2012    HPV Quadrivalent (Gardasil) 2013, 2013    Hep A, HAVRIX, VAQTA, (age 12m-18y), IM, 0.5mL 2012, 2013    Hep B, ENGERIX-B, RECOMBIVAX-HB, (age Birth - 19y), IM, 0.5mL 2003, 2003, 2003    Hib vaccine 2003, 2003, 2004    Influenza Virus Vaccine 2014    Influenza, FLUMIST, (age 2-49 yr), Trivalent Live, INTRANASAL, 0.2mL 10/12/2010, 2012, 2013, 10/06/2014    Influenza, FLUZONE High Dose, (age 65 y+), IM, Trivalent PF, 0.5mL 10/12/2010    MMR, PRIORIX, M-M-R II, (age 12m+), SC, 0.5mL 2004, 2008    Meningococcal ACWY, MENACTRA (MenACWY-D), (age 9m-55y), IM, 0.5mL 2015, 2020    Meningococcal B, BEXSERO, (age 10y-25y), IM, 0.5mL 2020    Polio Virus Vaccine 2008    Poliovirus, IPOL, (age 6w+), SC/IM, 0.5mL 2003, 2003    TDaP, ADACEL  Feedings:059610182}  Liquids: {Slp liquid thickness:11561}  Daily Fluid Restriction: {CHP DME Yes amt example:547535508}  Last Modified Barium Swallow with Video (Video Swallowing Test): {Done Not Done Date:}    Treatments at the Time of Hospital Discharge:   Respiratory Treatments: ***  Oxygen Therapy:  {Therapy; copd oxygen:21864}  Ventilator:    {[facility] Vent List:143654008}    Rehab Therapies: {THERAPEUTIC INTERVENTION:2115314128}  Weight Bearing Status/Restrictions: {[facility] Weight Bearin}  Other Medical Equipment (for information only, NOT a DME order):  {EQUIPMENT:155474365}  Other Treatments: ***    Patient's personal belongings (please select all that are sent with patient):  {P DME Belongings:352938420}    RN SIGNATURE:  {Esignature:114581269}    CASE MANAGEMENT/SOCIAL WORK SECTION    Inpatient Status Date: ***    Readmission Risk Assessment Score:  [facility] RISK OF UNPLANNED READMISSION 2.0             0 Total Score        Discharging to Facility/ Agency   Name:   Address:  Phone:  Fax:    Dialysis Facility (if applicable)   Name:  Address:  Dialysis Schedule:  Phone:  Fax:    / signature: {Esignature:406917587}    PHYSICIAN SECTION    Prognosis: {Prognosis:8017077336}    Condition at Discharge: { Patient Condition:135091140}    Rehab Potential (if transferring to Rehab): {Prognosis:3286074429}    Recommended Labs or Other Treatments After Discharge: ***    Physician Certification: I certify the above information and transfer of Awilda Arteaga  is necessary for the continuing treatment of the diagnosis listed and that she requires {Admit to Appropriate Level of Care:43955} for {GREATER/LESS:432497083} 30 days.     Update Admission H&P: {CHP DME Changes in HandP:387203721}    PHYSICIAN SIGNATURE:  {Esignature:338384676}

## 2024-12-20 NOTE — ED PROVIDER NOTES
[facility] URGENT CARE  UrgentCare Encounter      CHIEFCOMPLAINT       Chief Complaint   Patient presents with    Dental Pain     Right, lower       Nurses Notes reviewed and I agree except as noted in the HPI.  HISTORY OF PRESENT ILLNESS     Awilda Arteaga is a 24 y.o. female who presents to the urgent care for evaluation of dental pain that started 3 weeks ago.  Pain is on the right lower side. She has concern for infection. In between dentists at this time.     The patient/patient representative has no other acute complaints at this time.    REVIEW OF SYSTEMS     Review of Systems   Constitutional:  Negative for chills, fatigue and fever.   HENT:  Positive for dental problem. Negative for trouble swallowing and voice change.    Respiratory:  Negative for cough.    Cardiovascular:  Negative for chest pain.   Skin:  Negative for rash.       PAST MEDICAL HISTORY   History reviewed. No pertinent past medical history.    SURGICAL HISTORY     Patient  has no past surgical history on file.    CURRENT MEDICATIONS       Previous Medications    COLLAGEN PO    Take by mouth daily    CREATINE PO    Take by mouth    CYANOCOBALAMIN (VITAMIN B-12 PO)    Take by mouth daily    FLUTICASONE (FLONASE) 50 MCG/ACT NASAL SPRAY    1 spray by Each Nostril route daily    LORATADINE-PSEUDOEPHEDRINE (CLARITIN-D 24 HOUR)  MG PER EXTENDED RELEASE TABLET    Take 1 tablet by mouth daily    MULTIPLE VITAMINS-MINERALS (MULTIVITAMIN ADULTS PO)    Take by mouth daily    NONFORMULARY    daily BCAA Powder    OMEPRAZOLE (PRILOSEC) 20 MG DELAYED RELEASE CAPSULE    Take 1 capsule by mouth daily       ALLERGIES     Patient is has No Known Allergies.    FAMILY HISTORY     Patient'sfamily history includes High Blood Pressure in her mother.    SOCIAL HISTORY     Patient  reports that she has never smoked. She has never used smokeless tobacco. She reports that she does not drink alcohol and does not use drugs.    PHYSICAL EXAM     ED